# Patient Record
Sex: FEMALE | Race: WHITE | URBAN - METROPOLITAN AREA
[De-identification: names, ages, dates, MRNs, and addresses within clinical notes are randomized per-mention and may not be internally consistent; named-entity substitution may affect disease eponyms.]

---

## 2017-05-10 ENCOUNTER — HISTORICAL (OUTPATIENT)
Dept: ADMINISTRATIVE | Facility: HOSPITAL | Age: 67
End: 2017-05-10

## 2017-08-07 ENCOUNTER — HISTORICAL (OUTPATIENT)
Dept: ADMINISTRATIVE | Facility: HOSPITAL | Age: 67
End: 2017-08-07

## 2020-02-20 ENCOUNTER — HISTORICAL (OUTPATIENT)
Dept: CARDIOLOGY | Facility: HOSPITAL | Age: 70
End: 2020-02-20

## 2020-02-20 LAB
APTT PPP: 37.3 SECOND(S) (ref 23.2–33.7)
INR PPP: 1 (ref 0–1.3)
PLATELET # BLD AUTO: 306 X10(3)/MCL (ref 130–400)
PROTHROMBIN TIME: 12.5 SECOND(S) (ref 11.1–13.7)

## 2020-06-15 LAB
ALBUMIN SERPL-MCNC: 4.1 GM/DL (ref 3.4–5)
ALBUMIN/GLOB SERPL: 1.3 RATIO (ref 1.1–2)
ALP SERPL-CCNC: 125 UNIT/L (ref 40–150)
ALT SERPL-CCNC: 29 UNIT/L (ref 0–55)
APPEARANCE, UA: CLEAR
APTT PPP: 36.3 SECOND(S) (ref 23.2–33.7)
AST SERPL-CCNC: 24 UNIT/L (ref 5–34)
BACTERIA #/AREA URNS AUTO: NORMAL /HPF
BILIRUB SERPL-MCNC: 0.4 MG/DL
BILIRUB UR QL STRIP: NEGATIVE
BILIRUBIN DIRECT+TOT PNL SERPL-MCNC: 0.1 MG/DL (ref 0–0.5)
BILIRUBIN DIRECT+TOT PNL SERPL-MCNC: 0.3 MG/DL (ref 0–0.8)
BUN SERPL-MCNC: 20 MG/DL (ref 9.8–20.1)
CALCIUM SERPL-MCNC: 9.6 MG/DL (ref 8.4–10.2)
CHLORIDE SERPL-SCNC: 102 MMOL/L (ref 98–107)
CO2 SERPL-SCNC: 28 MMOL/L (ref 23–31)
COLOR UR: YELLOW
CREAT SERPL-MCNC: 0.92 MG/DL (ref 0.55–1.02)
ERYTHROCYTE [DISTWIDTH] IN BLOOD BY AUTOMATED COUNT: 14.9 % (ref 11.5–17)
GLOBULIN SER-MCNC: 3.1 GM/DL (ref 2.4–3.5)
GLUCOSE (UA): NEGATIVE
GLUCOSE SERPL-MCNC: 102 MG/DL (ref 82–115)
HCT VFR BLD AUTO: 42.6 % (ref 37–47)
HGB BLD-MCNC: 13.1 GM/DL (ref 12–16)
HGB UR QL STRIP: NEGATIVE
INR PPP: 0.9 (ref 0–1.3)
KETONES UR QL STRIP: NEGATIVE
LEUKOCYTE ESTERASE UR QL STRIP: NEGATIVE
MCH RBC QN AUTO: 28.5 PG (ref 27–31)
MCHC RBC AUTO-ENTMCNC: 30.8 GM/DL (ref 33–36)
MCV RBC AUTO: 92.6 FL (ref 80–94)
NITRITE UR QL STRIP.AUTO: NEGATIVE
PH UR STRIP: 7 [PH] (ref 5–9)
PLATELET # BLD AUTO: 298 X10(3)/MCL (ref 130–400)
PMV BLD AUTO: 10.8 FL (ref 9.4–12.4)
POTASSIUM SERPL-SCNC: 4.7 MMOL/L (ref 3.5–5.1)
PROT SERPL-MCNC: 7.2 GM/DL (ref 5.8–7.6)
PROT UR QL STRIP: NEGATIVE
PROTHROMBIN TIME: 12.1 SECOND(S) (ref 11.1–13.7)
RBC # BLD AUTO: 4.6 X10(6)/MCL (ref 4.2–5.4)
RBC #/AREA URNS HPF: NORMAL /[HPF]
SODIUM SERPL-SCNC: 142 MMOL/L (ref 136–145)
SP GR UR STRIP: 1.01 (ref 1–1.03)
SQUAMOUS EPITHELIAL, UA: NORMAL
UROBILINOGEN UR STRIP-ACNC: 0.2
WBC # SPEC AUTO: 10 X10(3)/MCL (ref 4.5–11.5)
WBC #/AREA URNS AUTO: NORMAL /HPF (ref 0–3)

## 2020-06-18 ENCOUNTER — HISTORICAL (OUTPATIENT)
Dept: ONCOLOGY | Facility: HOSPITAL | Age: 70
End: 2020-06-18

## 2020-06-18 LAB — GROUP & RH: NORMAL

## 2022-04-11 ENCOUNTER — HISTORICAL (OUTPATIENT)
Dept: ADMINISTRATIVE | Facility: HOSPITAL | Age: 72
End: 2022-04-11

## 2022-04-27 VITALS
DIASTOLIC BLOOD PRESSURE: 78 MMHG | WEIGHT: 188.94 LBS | SYSTOLIC BLOOD PRESSURE: 138 MMHG | BODY MASS INDEX: 32.26 KG/M2 | HEIGHT: 64 IN

## 2022-04-30 NOTE — OP NOTE
DATE OF SURGERY:    06/18/2020    SURGEON:  Dakota Adams MD  ASSISTANT:  Eusebio Parks    PREOPERATIVE DIAGNOSES:    1. Lumbar spinal stenosis with neurogenic claudication.  2. Lumbar radiculopathy.  3. Low back pain.  4. Lumbar degenerative disk disease.  5. Lumbar spondylosis.    POSTOPERATIVE DIAGNOSES:    1. Lumbar spinal stenosis with neurogenic claudication.  2. Lumbar radiculopathy.  3. Low back pain.  4. Lumbar degenerative disk disease.  5. Lumbar spondylosis.    PROCEDURE:    1. Right L3-4 hemilaminectomy, medial facetectomy, L4 foraminotomy.  2. Right L4-5 hemilaminectomy, medial facetectomy, L5 foraminotomy.  3. Operative microscopic dissection.    INDICATIONS FOR PROCEDURE:  Ms. Val Pérez is a 69-year-old female who presented to my clinic with complaints of back pain and right lower extremity radiculopathy radiating down the L4 and L5 distribution.  She rated her pain as 8/10 at worst.  She had significant back as well as right leg pain complaints.  She underwent extensive conservative therapy including anti-inflammatory administration, epidural injection therapy, medial branch blocks and RFAs as well as 6 weeks of physical therapy.  Despite this, she had persistent severe symptomatology.  On physical exam, the patient had paresthesias in her lower extremities.  She also had 4/5 in her right anterior tibialis muscle group.  CT myelogram showed severe spinal canal stenosis at L3-4 and L4-5.  The patient was counseled extensively on her options which included continued conservative care, open decompression fusion procedure versus minimally invasive decompression versus spinal cord stimulator.  In light of the patient's age and comorbidities, I felt the best course of action moving forward would likely be minimally invasive decompression to treat her significant claudication and radiculopathic complaints.  She voiced understanding and elected to go forward.    PROCEDURE IN DETAIL:  After  informed consent was obtained, the patient was brought to the operating room and placed under general anesthesia, intubated by the anesthesia team, transferred to the operative table in the prone position, appropriately padded, prepped and draped in the usual sterile fashion.       I began the procedure by making a dorsal midline incision fluoroscopically located over the L3-4 and L4-5 vertebral body level.  Dissection was carried down to the underlying dorsal lumbar fascia.  A right-sided paramedian incision was made.  Tubular dilatation occurred over the trailing edge of the L3 lamina on the right and fluoroscopy confirmed level.     The operative microscope was brought in.  A high-speed pneumatic niraj was used to perform hemilaminectomy and medial facetectomy.  I carried the hemilaminectomy up to the cranial edge of the yellow ligament, undermined with a 6-0 angled curet and excised with 2 Kerrison punch.  I identified the traversing L4 nerve root and decompressed it within the lateral recess as well as proximal neural foramen with a 2 Kerrison punch.  Irrigation washed out the incision.  Hemostasis was achieved.     I then performed tubular dilatation over the trailing edge of the L4 lamina on the right and fluoroscopy confirmed level.  The operative microscope was brought in.  A high-speed pneumatic niraj was used to perform a hemilaminectomy and a medial facetectomy.  I carried the hemilaminectomy up to the cranial edge of the yellow ligament.  I undermined it with a 6-0 angled curet and excised with a 2 Kerrison punch, identified the traversing L5 nerve root.  I decompressed it within lateral recess as well as the proximal neural foramen with a 2 Kerrison punch.  Copious irrigation washed out the entirety of the incision.  Complete hemostasis was achieved.  Tubular retractor was withdrawn.  Standard layered closure was performed.  All counts correct x2.    ESTIMATED BLOOD LOSS:  25 cc.    BLOOD REPLACED:   None.    SPECIMENS:  None.    IMPLANTS:  None.    APPARENT OPERATIVE COMPLICATIONS:  None.      NEUROMONITORING:  Signals stayed stable throughout the entirety of the case.        ______________________________  MD LINDSEY Ferrara/UD  DD:  06/21/2020  Time:  09:56AM  DT:  06/21/2020  Time:  10:25AM  Job #:  [id number]

## 2024-09-26 DIAGNOSIS — D68.00 VWD (VON WILLEBRAND'S DISEASE): ICD-10-CM

## 2024-09-26 DIAGNOSIS — D68.59 HYPERCOAGULABLE STATE: Primary | ICD-10-CM
